# Patient Record
Sex: FEMALE | Race: WHITE | NOT HISPANIC OR LATINO | URBAN - METROPOLITAN AREA
[De-identification: names, ages, dates, MRNs, and addresses within clinical notes are randomized per-mention and may not be internally consistent; named-entity substitution may affect disease eponyms.]

---

## 2022-05-13 ENCOUNTER — OFFICE (OUTPATIENT)
Dept: URBAN - METROPOLITAN AREA CLINIC 10 | Facility: CLINIC | Age: 77
End: 2022-05-13

## 2022-05-13 VITALS
SYSTOLIC BLOOD PRESSURE: 140 MMHG | DIASTOLIC BLOOD PRESSURE: 71 MMHG | OXYGEN SATURATION: 99 % | WEIGHT: 133 LBS | HEIGHT: 64 IN | HEART RATE: 89 BPM

## 2022-05-13 DIAGNOSIS — R10.11 RIGHT UPPER QUADRANT PAIN: ICD-10-CM

## 2022-05-13 PROCEDURE — 99203 OFFICE O/P NEW LOW 30 MIN: CPT | Performed by: INTERNAL MEDICINE

## 2025-04-29 ENCOUNTER — OFFICE (OUTPATIENT)
Dept: URBAN - NONMETROPOLITAN AREA CLINIC 9 | Facility: CLINIC | Age: 80
End: 2025-04-29
Payer: MEDICARE

## 2025-04-29 VITALS
HEART RATE: 99 BPM | WEIGHT: 104 LBS | HEIGHT: 63 IN | DIASTOLIC BLOOD PRESSURE: 76 MMHG | RESPIRATION RATE: 16 BRPM | SYSTOLIC BLOOD PRESSURE: 160 MMHG

## 2025-04-29 DIAGNOSIS — A04.71 ENTEROCOLITIS DUE TO CLOSTRIDIUM DIFFICILE, RECURRENT: ICD-10-CM

## 2025-04-29 PROCEDURE — 99204 OFFICE O/P NEW MOD 45 MIN: CPT | Performed by: NURSE PRACTITIONER

## 2025-04-29 RX ORDER — LACTOBACIL 2/BIFIDO 1/S.THERMO 450B CELL
PACKET (EA) ORAL
Qty: 30 | Refills: 5 | Status: ACTIVE
Start: 2025-04-29

## 2025-04-29 NOTE — SERVICENOTES
1.  patient has been treated with flagyl, vancomycin, and then long term vancomycin taper
2.  with return of symptoms, consider difficid
if return of symptoms, may send cdiff order to lab of choice
[facility]
cdiff education/ treatment/ probiotics/stool transplant
avoidance of antibiotics-risks associated
3.  follow up in 3 months

## 2025-04-29 NOTE — SERVICEHPINOTES
Nena Rooney   is a   78 yo  female   from [place] whom I am seeing as a new patient today referred by FLORY Stauffer for recurrent c diff.  PMH of HLD, HTN, hypomagnesium, anemia, diverticulosis, and CKD,stage IV. Patient is here today with her spouse with reports of reoccurrence of c diff.  This initially started with the  12/26/24.  Patient started in January.  She has a history of treatment with flagyl, vancomycin and then with reoccurrence, she had treatment with a extensive tapered treatment with vancomycin.  She is having formed stools at this time.  She denies abdominal pain, melena, or bright red blood.  She has never had a colonoscopy and denies any GI issues prior to this incident.  She has become very ill during these episodes in which she become dehydrated and has lost 30-40 lbs. They report diagnosis of infectious colitis and hospital admission 2-7 through 2-11-25 at [facility].. She is eating now and much improved however, she and her  are concerned for future treatment if reoccurrence.
br
br   ct abd/pelvis 2/6/25 with noted left sided colitis
br
br